# Patient Record
(demographics unavailable — no encounter records)

---

## 2025-03-02 NOTE — DISCUSSION/SUMMARY
[FreeTextEntry1] : Given symptoms will get stress.  Same medications for now.  [EKG obtained to assist in diagnosis and management of assessed problem(s)] : EKG obtained to assist in diagnosis and management of assessed problem(s)

## 2025-03-02 NOTE — HISTORY OF PRESENT ILLNESS
[FreeTextEntry1] : Ms. HOLGUIN presents for the evaluation of SOB She has SSS and PPM. Recently checked. Normal function but does have PAT runs She has noted more recently an increase of frequency of SOB. Sometimes with minimal exertion.